# Patient Record
Sex: FEMALE | URBAN - METROPOLITAN AREA
[De-identification: names, ages, dates, MRNs, and addresses within clinical notes are randomized per-mention and may not be internally consistent; named-entity substitution may affect disease eponyms.]

---

## 2019-06-03 ENCOUNTER — APPOINTMENT (RX ONLY)
Dept: URBAN - METROPOLITAN AREA CLINIC 45 | Facility: CLINIC | Age: 44
Setting detail: DERMATOLOGY
End: 2019-06-03

## 2019-06-03 DIAGNOSIS — I83.9 ASYMPTOMATIC VARICOSE VEINS OF LOWER EXTREMITIES: ICD-10-CM

## 2019-06-03 DIAGNOSIS — L71.8 OTHER ROSACEA: ICD-10-CM

## 2019-06-03 PROBLEM — D23.39 OTHER BENIGN NEOPLASM OF SKIN OF OTHER PARTS OF FACE: Status: ACTIVE | Noted: 2019-06-03

## 2019-06-03 PROBLEM — L29.8 OTHER PRURITUS: Status: ACTIVE | Noted: 2019-06-03

## 2019-06-03 PROBLEM — L85.3 XEROSIS CUTIS: Status: ACTIVE | Noted: 2019-06-03

## 2019-06-03 PROBLEM — L70.0 ACNE VULGARIS: Status: ACTIVE | Noted: 2019-06-03

## 2019-06-03 PROBLEM — I83.93 ASYMPTOMATIC VARICOSE VEINS OF BILATERAL LOWER EXTREMITIES: Status: ACTIVE | Noted: 2019-06-03

## 2019-06-03 PROCEDURE — 99202 OFFICE O/P NEW SF 15 MIN: CPT

## 2019-06-03 PROCEDURE — ? RECOMMENDATIONS

## 2019-06-03 PROCEDURE — ? COUNSELING

## 2019-06-03 PROCEDURE — ? BENIGN DESTRUCTION COSMETIC

## 2019-06-03 PROCEDURE — ? TREATMENT REGIMEN

## 2019-06-03 ASSESSMENT — LOCATION SIMPLE DESCRIPTION DERM
LOCATION SIMPLE: RIGHT CHEEK
LOCATION SIMPLE: RIGHT PRETIBIAL REGION
LOCATION SIMPLE: LEFT PRETIBIAL REGION

## 2019-06-03 ASSESSMENT — LOCATION DETAILED DESCRIPTION DERM
LOCATION DETAILED: RIGHT PROXIMAL PRETIBIAL REGION
LOCATION DETAILED: RIGHT INFERIOR CENTRAL MALAR CHEEK
LOCATION DETAILED: LEFT PROXIMAL PRETIBIAL REGION

## 2019-06-03 ASSESSMENT — LOCATION ZONE DERM
LOCATION ZONE: LEG
LOCATION ZONE: FACE

## 2019-06-03 NOTE — PROCEDURE: TREATMENT REGIMEN
Detail Level: Zone
Plan: Discussed electrocautery on lesions $150 for one session on face
Action 4: Continue
Other Instructions: Discussed laser treatments in the fall

## 2019-06-03 NOTE — HPI: SKIN LESION
What Type Of Note Output Would You Prefer (Optional)?: Bullet Format
How Severe Is Your Skin Lesion?: mild
Has Your Skin Lesion Been Treated?: been treated
Is This A New Presentation, Or A Follow-Up?: Follow Up Skin Lesion
When Was It Treated?: 03/13/2014
Additional History: History of fibrous papules. Patient last treated by [facility] 03/2014 last quoted $150.

## 2019-06-03 NOTE — HPI: SPIDER VEINS
How Severe Are They?: mild
Is This A New Presentation, Or A Follow-Up?: Spider Veins
Additional History: Patient’s father has bad spider veins and patient would like to keep hers at bay.

## 2019-06-03 NOTE — PROCEDURE: RECOMMENDATIONS
Recommendation Preamble: The following recommendations were made during the visit: laser treatments in the fall
Detail Level: Zone

## 2019-08-08 NOTE — PROCEDURE: BENIGN DESTRUCTION COSMETIC
Detail Level: Detailed
Post-Care Instructions: I reviewed with the patient in detail post-care instructions. Patient is to wear sunprotection, and avoid picking at any of the treated lesions. Pt should apply Aquaphor or Vaseline to crusted or scabbing areas.
Topical Anesthesia?: 23% lidocaine, 7% tetracaine
Consent: The patient's consent was obtained including but not limited to risks of crusting, scabbing, blistering, scarring, darker or lighter pigmentary change, recurrence, incomplete removal and infection.
Price (Use Numbers Only, No Special Characters Or $): 150
Anesthesia Volume In Cc: 0.5
mild impairment/dysmetria noted w/ decreased speed

## 2023-10-31 ENCOUNTER — OFFICE VISIT (OUTPATIENT)
Dept: DERMATOLOGY | Facility: CLINIC | Age: 48
End: 2023-10-31
Payer: COMMERCIAL

## 2023-10-31 DIAGNOSIS — Z79.899 OTHER LONG TERM (CURRENT) DRUG THERAPY: ICD-10-CM

## 2023-10-31 DIAGNOSIS — L70.0 ACNE VULGARIS: Primary | ICD-10-CM

## 2023-10-31 DIAGNOSIS — D23.9 DERMATOFIBROMA: ICD-10-CM

## 2023-10-31 DIAGNOSIS — L71.9 ROSACEA: ICD-10-CM

## 2023-10-31 DIAGNOSIS — L73.9 FOLLICULITIS: ICD-10-CM

## 2023-10-31 PROCEDURE — 99204 OFFICE O/P NEW MOD 45 MIN: CPT | Performed by: STUDENT IN AN ORGANIZED HEALTH CARE EDUCATION/TRAINING PROGRAM

## 2023-10-31 RX ORDER — KETOROLAC TROMETHAMINE 10 MG/1
TABLET, FILM COATED ORAL
COMMUNITY

## 2023-10-31 RX ORDER — SPIRONOLACTONE 50 MG/1
50 TABLET, FILM COATED ORAL DAILY
Qty: 30 TABLET | Refills: 11 | Status: SHIPPED | OUTPATIENT
Start: 2023-10-31 | End: 2024-10-30

## 2023-10-31 RX ORDER — IPRATROPIUM BROMIDE AND ALBUTEROL 20; 100 UG/1; UG/1
SPRAY, METERED RESPIRATORY (INHALATION)
COMMUNITY
Start: 2022-11-03

## 2023-10-31 RX ORDER — CLINDAMYCIN PHOSPHATE 10 UG/ML
LOTION TOPICAL 2 TIMES DAILY
Qty: 60 ML | Refills: 11 | Status: SHIPPED | OUTPATIENT
Start: 2023-10-31 | End: 2024-10-30

## 2023-10-31 RX ORDER — SEMAGLUTIDE 1.34 MG/ML
1 INJECTION, SOLUTION SUBCUTANEOUS
COMMUNITY

## 2023-10-31 NOTE — PROGRESS NOTES
Negative covid test noted on this adult, filed per  protocol. Subjective     Niesha Cary is a 48 y.o. female who presents for the following: Rash, Rosacea, and Suspicious Skin Lesion.     Review of Systems:  No other skin or systemic complaints other than what is documented elsewhere in the note.    The following portions of the chart were reviewed this encounter and updated as appropriate:          Skin Cancer History  No skin cancer on file.      Specialty Problems    None       Objective   Well appearing patient in no apparent distress; mood and affect are within normal limits.    A focused skin examination was performed. All findings within normal limits unless otherwise noted below.    Assessment/Plan   1. Acne vulgaris    Related Procedures  Potassium    Related Medications  clindamycin (Cleocin T) 1 % lotion  Apply topically 2 times a day. 60g    spironolactone (Aldactone) 50 mg tablet  Take 1 tablet (50 mg) by mouth once daily.    2. Other long term (current) drug therapy    Related Procedures  Potassium    3. Dermatofibroma  Right Popliteal Fossa  Firm pink-brown papule that dimples with lateral pressure.    Reassured of benign nature of lesion      4. Rosacea  Head - Anterior (Face)  Mid face erythema with telangiectasias and scattered inflammatory papules.    Discussed chronic nature of condition, and relationship with genetic predisposition and sun or other environmental exposures   -Discussed importance of sun protection to prevent flares and progression   -Start with acne/folliculitis treatment regimen, which may help somewhat and we can discuss at next visit if not improving    5. Folliculitis  Torso - Posterior (Back)  Scattered erythematous folliculocentric papules and pustules    Advised to use benzoyl peroxide 10% wash every morning, pat dry with white towel (can stain fabrics/clothing), then apply clindamycin 1% lotion to the affected areas everyday. Do not to use clindamycin alone as this could increase risk of developing resistance. Discussed  that it can take months before seeing improvement, and advised that patience and adherance to the recommended regimen is critical to see improvement. Patient verbalized understanding and agreement with plan    Discussed options of doxycycline vs spironolactone (given her hx PCOS, improvement with OCPs). She reports significant diarrhea with doxy in past and would prefer to try spironolactone.     Advised to start spironolactone 50mg daily, and potentially increase dose in future as needed/tolerated. The following information regarding the use of Spironolactone was discussed: Potential side effects including headache, dizziness, fatigue, breast tenderness, irregular menses, and hyperkalemia. Women who are pregnant or breast feeding should not take Spironolactone. Patient should drink plenty of water while taking this medication. Risks, benefits, side effects, alternatives and options were discussed withpatient and the patient voiced understanding and agreement.     Will check baseline potassium level. Last level 1 year ago was wnl.

## 2024-02-01 ENCOUNTER — APPOINTMENT (OUTPATIENT)
Dept: DERMATOLOGY | Facility: CLINIC | Age: 49
End: 2024-02-01
Payer: COMMERCIAL

## 2024-02-15 ENCOUNTER — OFFICE VISIT (OUTPATIENT)
Dept: DERMATOLOGY | Facility: CLINIC | Age: 49
End: 2024-02-15
Payer: COMMERCIAL

## 2024-02-15 DIAGNOSIS — L70.0 ACNE VULGARIS: ICD-10-CM

## 2024-02-15 DIAGNOSIS — D48.5 NEOPLASM OF UNCERTAIN BEHAVIOR OF SKIN: Primary | ICD-10-CM

## 2024-02-15 DIAGNOSIS — L73.9 FOLLICULITIS: ICD-10-CM

## 2024-02-15 DIAGNOSIS — L71.9 ROSACEA: ICD-10-CM

## 2024-02-15 PROCEDURE — 11104 PUNCH BX SKIN SINGLE LESION: CPT | Performed by: STUDENT IN AN ORGANIZED HEALTH CARE EDUCATION/TRAINING PROGRAM

## 2024-02-15 PROCEDURE — 88312 SPECIAL STAINS GROUP 1: CPT | Performed by: DERMATOLOGY

## 2024-02-15 PROCEDURE — 99213 OFFICE O/P EST LOW 20 MIN: CPT | Performed by: STUDENT IN AN ORGANIZED HEALTH CARE EDUCATION/TRAINING PROGRAM

## 2024-02-15 PROCEDURE — 88305 TISSUE EXAM BY PATHOLOGIST: CPT | Performed by: DERMATOLOGY

## 2024-02-15 NOTE — PROGRESS NOTES
Subjective   Niesha Cary is a 48 y.o. female who presents for the following: Acne (LV: 10/31/23.  Acne/folliculitis. Acne and folliculitis on back.  Currently using Clindamycin 1% lotion BID to back, BPO 10% wash once daily to back.  Did not start taking Spironolactone 50 mg daily.  Notes some improvement especially when she follows a gluten free diet.  Notes itching on back as well. ) and Rosacea.    Acne seems improved but also now getting a different rash on back.   Back rash - worse with heat, gluten diet. Feels extremely itchy.   Reports hx some sunburns in youth.     The following portions of the chart were reviewed this encounter and updated as appropriate:         Review of Systems: No other skin or systemic complaints.    Objective   Well appearing patient in no apparent distress; mood and affect are within normal limits.    A focused examination was performed including upper extremities, including the arms, hands, fingers, and fingernails, head, including the scalp, face, neck, nose, ears, eyelids, and lips, and chest, axillae, abdomen, back, and buttocks. All findings within normal limits unless otherwise noted below.    Right Upper Back  Scattered perifollicular and non-perifollicular small erythematous papules with central yellowish scale            Assessment/Plan   Neoplasm of uncertain behavior of skin  Right Upper Back    Lesion biopsy  Type of biopsy: punch    Informed consent: discussed and consent obtained    Timeout: patient name, date of birth, surgical site, and procedure verified    Procedure prep:  Patient was prepped and draped  Anesthesia: the lesion was anesthetized in a standard fashion    Anesthetic:  1% lidocaine w/ epinephrine 1-100,000 local infiltration  Punch size:  4 mm  Suture size:  4-0  Suture type: Prolene (polypropylene)    Suture removal (days):  14  Hemostasis achieved with: suture    Outcome: patient tolerated procedure well    Post-procedure details: sterile  dressing applied and wound care instructions given    Dressing type: bandage and petrolatum      Staff Communication: Dermatology Local Anesthesia: 1 % Lidocaine / Epinephrine - Amount: 0.1cc    Specimen 1 - Dermatopathology- DERM LAB  Differential Diagnosis: inflamed KP vs folliculitis vs grovers vs atypical dermatitis  Check Margins Yes/No?:    Comments:    Dermpath Lab: Routine Histopathology (formalin-fixed tissue)    Some acneiform lesions, but there also are now scattered erythematous ryan-follicular and non-perifollicular small papules, some with central yellowish scale.   Could be grovers vs inflamed KP vs atypical dermatitis. Offered biopsy to help clarify diagnosis.      Acne vulgaris    Related Procedures  Follow Up In Dermatology - Established Patient    Related Medications  clindamycin (Cleocin T) 1 % lotion  Apply topically 2 times a day. 60g    spironolactone (Aldactone) 50 mg tablet  Take 1 tablet (50 mg) by mouth once daily.    Rosacea    Related Procedures  Follow Up In Dermatology - Established Patient    Folliculitis    Related Procedures  Follow Up In Dermatology - Established Patient        Scribe Attestation  By signing my name below, IRita LPN , Scribe   attest that this documentation has been prepared under the direction and in the presence of Leroy Smiely MD.

## 2024-02-20 LAB
LABORATORY COMMENT REPORT: NORMAL
PATH REPORT.FINAL DX SPEC: NORMAL
PATH REPORT.GROSS SPEC: NORMAL
PATH REPORT.MICROSCOPIC SPEC OTHER STN: NORMAL
PATH REPORT.RELEVANT HX SPEC: NORMAL
PATH REPORT.TOTAL CANCER: NORMAL

## 2024-02-21 NOTE — RESULT ENCOUNTER NOTE
Consistent with folliculitis (inflamed hair follicles). This can be very itchy for some patients. She can continue her topical antibacterial regimen (BPO wash and clindamycin lotion), and we can add in TAC 0.1% lotion prn if she wishes to help with itching flares.

## 2024-02-28 ENCOUNTER — DOCUMENTATION (OUTPATIENT)
Dept: DERMATOLOGY | Facility: CLINIC | Age: 49
End: 2024-02-28
Payer: COMMERCIAL

## 2024-02-28 DIAGNOSIS — L73.9 FOLLICULITIS: ICD-10-CM

## 2024-02-28 DIAGNOSIS — R19.7 DIARRHEA, UNSPECIFIED TYPE: Primary | ICD-10-CM

## 2024-02-28 NOTE — PROGRESS NOTES
Discussed biopsy results with pt.; consistent with folliculitis.  Pt states she would like to know what else can be done as she believes gluten is causing her to get hives.  She would like to know if there is anything else that can be done to determine the cause of this.  Pt is aware this will be discussed with Dr. Smiley and we will get back with her. Rita Marie, EZEQUIEL

## 2024-02-28 NOTE — RESULT ENCOUNTER NOTE
Called and discussed results with pt.  Discussed starting TAC 0.1% lotion.  Pt verbalized understanding.  Rita Marie LPN

## 2024-03-04 NOTE — PROGRESS NOTES
"Called and discussed below from Dr. Smiley with pt.    \"There was not evidence of hives on her biopsy or exam when I saw her. She may be getting hives, but that's not what was going on actively when I saw her. Hives do come and go, so it's definitely possible, and maybe they were just not active when I saw her.     Some people do think they get more rashes or flares of their rashes with gluten. Perhaps her folliculitis is flaring with gluten? I am not aware of a test for this though, which is really unfortunate. She could try to eliminate gluten and monitor for improvement in the rash.     There are blood tests for celiac disease, but I believe when we talked she said she denied chronic diarrhea or other GI symptoms. Regardless, if she would like, we can check her antibodies if she is very suspicious of gluten relationship with her rash flaring. \"    -Pt states the hives were present at office visit.  -She does note having chronic diarrhea for over 1 year.  Notes improvement in GI symptoms and hives when she eliminates gluten.  -Pt states she had a colonoscopy which was negative for celiac disease, but notes she does have a sensitivity.  -Pt would like further information regarding antibody testing.  Is this a blood test?  Should she eat gluten prior to testing?    Rita Marie, EZEQUIEL    "

## 2024-03-06 ENCOUNTER — TELEPHONE (OUTPATIENT)
Dept: DERMATOLOGY | Facility: CLINIC | Age: 49
End: 2024-03-06
Payer: COMMERCIAL

## 2024-03-06 RX ORDER — TRIAMCINOLONE ACETONIDE 1 MG/ML
LOTION TOPICAL
Qty: 60 ML | Refills: 3 | Status: SHIPPED | OUTPATIENT
Start: 2024-03-06

## 2024-03-06 NOTE — LETTER
"March 6, 2024    Patient: Niesha Cary   Patient Address: [address] [place], [place] [id number]   YOB: 1975   Date of Visit: 3/6/2024     What is a low-FODMAP diet?  FODMAP stands for \"fermentable oligosaccharides, disaccharides, monosaccharides, and polyols.\" These are types of carbohydrates that are harder for the body to digest than other types. A low-FODMAP diet means limiting foods that contain these kinds of carbohydrates.    Because foods with FODMAPs are hard to digest, they:    ?Pull extra water into your intestines    ?Cause the normal bacteria in your intestines to make gas    For some people, this does not cause any problems. But for some people, eating FODMAPs makes digestive symptoms worse.    Why do I need a low-FODMAP diet?  Your doctor or nurse might suggest that you try a low-FODMAP diet if you have problems such as belly pain or bloating. These symptoms often happen in people with irritable bowel syndrome (\"IBS\") or active inflammatory bowel disease (\"IBD\").    What should I know about a low-FODMAP diet before starting?  A low-FODMAP diet can mean avoiding or limiting a lot of foods. If your doctor or nurse suggests that you try this diet, they will often have you work with a dietitian. A dietitian can help make sure that you are getting the right nutrition.    Most people are advised to start by avoiding a lot of foods with FODMAPs for 6 to 8 weeks as their symptoms get better. Then, they slowly start to add these foods back to their diet 1 at a time while watching for symptoms to start again. This helps them figure out what types and amounts of FODMAPs they can eat and still feel good.    What can I eat and drink on a low-FODMAP diet?  Below are some examples of foods that are low in FODMAPs.    ?Grains - White rice, brown rice, quinoa, cornmeal, and oats. Choose grains that are marked certified gluten-free.    ?Dairy products - Lactose-free dairy products like almond milk " and oat milk. Certain cheeses like cheddar, Joey, brie, mozzarella, parmesan, and feta.    ?Meats and proteins - Lean, tender, well-cooked meats, chicken, and fish. Eggs, tempeh, tofu, peanuts, pecans, macadamia nuts, walnuts, pine nuts, pumpkin seed, sesame seeds, sunflower seeds, and nut butters made with these nuts.    ?Fruits - Grapes, oranges, strawberries, blueberries, raspberries, pineapple, honeydew melon, cantaloupe, kiwi, limes, and starfruit.    ?Vegetables - Zucchini, cucumbers, eggplant, squash, potatoes, celery, carrots, bell peppers, bean sprouts, radishes, kale, spinach, bok dario, green beans, and lettuce.    ?Other foods - Canola and olive oils and dark chocolate. Use maple syrup, brown sugar, stevia, or table sugar as sweeteners.    What foods and drinks should I avoid on a low-FODMAP diet?  Below are some examples of foods that are high in FODMAPs. Avoid these foods.    ?Grains to avoid - Wheat, barley, rye, and products made with these grains such as bread, cereal, biscuits, and crackers.    ?Dairy products to avoid - Dairy products with lactose like milk and milk products like custard, yogurt, buttermilk, soft cheese, whipped cream, cottage cheese, ice cream, and soy milk.    ?Meats and proteins to avoid - Pistachios, cashews, and nut butters made from these nuts. Hummus, dried peas, lentils, beans, and other legumes.    ?Fruits to avoid - Apples, pears, mangoes, cherries, apricots, nectarines, plums, watermelon, peaches, blackberries, prunes, avocados, and fruit juices made with these fruits.    ?Vegetables to avoid - Onions, leeks, garlic, shallots, asparagus, sugar snap peas, mushrooms, cauliflower, artichokes, and green peas.    ?Other foods to avoid - High-fructose corn syrup, agave nectar, honey, priscilla and chamomile tea, soda, sports drinks, milk chocolate. Low-calorie sweeteners such as xylitol, which is found in sugar-free gum and mints. Added fibers in the form of chicory root or  inulin.

## 2024-07-30 ENCOUNTER — LAB (OUTPATIENT)
Dept: LAB | Facility: LAB | Age: 49
End: 2024-07-30
Payer: COMMERCIAL

## 2024-07-30 DIAGNOSIS — R19.7 DIARRHEA, UNSPECIFIED TYPE: ICD-10-CM

## 2024-07-30 DIAGNOSIS — Z79.899 OTHER LONG TERM (CURRENT) DRUG THERAPY: ICD-10-CM

## 2024-07-30 DIAGNOSIS — L70.0 ACNE VULGARIS: ICD-10-CM

## 2024-07-30 LAB
GLIADIN PEPTIDE IGA SER IA-ACNC: 3.1 U/ML
POTASSIUM SERPL-SCNC: 4.7 MMOL/L (ref 3.5–5.3)
TTG IGA SER IA-ACNC: <1 U/ML

## 2024-07-30 PROCEDURE — 83516 IMMUNOASSAY NONANTIBODY: CPT

## 2024-07-30 PROCEDURE — 84132 ASSAY OF SERUM POTASSIUM: CPT

## 2024-07-30 PROCEDURE — 36415 COLL VENOUS BLD VENIPUNCTURE: CPT

## 2024-08-01 LAB
GLIADIN PEPTIDE IGG SER IA-ACNC: <0.56 FLU (ref 0–4.99)
TTG IGG SER IA-ACNC: <0.82 FLU (ref 0–4.99)